# Patient Record
Sex: MALE | Race: WHITE | NOT HISPANIC OR LATINO | Employment: FULL TIME | URBAN - METROPOLITAN AREA
[De-identification: names, ages, dates, MRNs, and addresses within clinical notes are randomized per-mention and may not be internally consistent; named-entity substitution may affect disease eponyms.]

---

## 2017-08-18 ENCOUNTER — HOSPITAL ENCOUNTER (EMERGENCY)
Facility: HOSPITAL | Age: 62
Discharge: HOME | End: 2017-08-18
Attending: EMERGENCY MEDICINE

## 2017-08-18 VITALS
HEART RATE: 76 BPM | RESPIRATION RATE: 18 BRPM | WEIGHT: 180 LBS | BODY MASS INDEX: 33.13 KG/M2 | SYSTOLIC BLOOD PRESSURE: 151 MMHG | TEMPERATURE: 98 F | DIASTOLIC BLOOD PRESSURE: 89 MMHG | OXYGEN SATURATION: 98 % | HEIGHT: 62 IN

## 2017-08-18 DIAGNOSIS — R73.9 HYPERGLYCEMIA: ICD-10-CM

## 2017-08-18 DIAGNOSIS — R51.9 HEADACHE, UNSPECIFIED HEADACHE TYPE: Primary | ICD-10-CM

## 2017-08-18 LAB
ALBUMIN SERPL BCP-MCNC: 3.8 G/DL
ALP SERPL-CCNC: 137 U/L
ALT SERPL W/O P-5'-P-CCNC: 24 U/L
ANION GAP SERPL CALC-SCNC: 10 MMOL/L
AST SERPL-CCNC: 15 U/L
BASOPHILS # BLD AUTO: 0.02 K/UL
BASOPHILS NFR BLD: 0.3 %
BILIRUB SERPL-MCNC: 0.4 MG/DL
BUN SERPL-MCNC: 18 MG/DL
CALCIUM SERPL-MCNC: 9.2 MG/DL
CHLORIDE SERPL-SCNC: 99 MMOL/L
CO2 SERPL-SCNC: 24 MMOL/L
CREAT SERPL-MCNC: 1.4 MG/DL
DIFFERENTIAL METHOD: ABNORMAL
EOSINOPHIL # BLD AUTO: 0.2 K/UL
EOSINOPHIL NFR BLD: 2 %
ERYTHROCYTE [DISTWIDTH] IN BLOOD BY AUTOMATED COUNT: 13.3 %
EST. GFR  (AFRICAN AMERICAN): >60 ML/MIN/1.73 M^2
EST. GFR  (NON AFRICAN AMERICAN): 54 ML/MIN/1.73 M^2
GLUCOSE SERPL-MCNC: 252 MG/DL
GLUCOSE SERPL-MCNC: 342 MG/DL (ref 70–110)
HCT VFR BLD AUTO: 39.7 %
HGB BLD-MCNC: 13.4 G/DL
LYMPHOCYTES # BLD AUTO: 2.4 K/UL
LYMPHOCYTES NFR BLD: 31.9 %
MCH RBC QN AUTO: 26.1 PG
MCHC RBC AUTO-ENTMCNC: 33.8 G/DL
MCV RBC AUTO: 77 FL
MONOCYTES # BLD AUTO: 0.8 K/UL
MONOCYTES NFR BLD: 9.9 %
NEUTROPHILS # BLD AUTO: 4.2 K/UL
NEUTROPHILS NFR BLD: 55.6 %
PLATELET # BLD AUTO: 293 K/UL
PMV BLD AUTO: 8.7 FL
POTASSIUM SERPL-SCNC: 3.8 MMOL/L
PROT SERPL-MCNC: 7.5 G/DL
RBC # BLD AUTO: 5.13 M/UL
SODIUM SERPL-SCNC: 133 MMOL/L
WBC # BLD AUTO: 7.59 K/UL

## 2017-08-18 PROCEDURE — 25000003 PHARM REV CODE 250: Performed by: EMERGENCY MEDICINE

## 2017-08-18 PROCEDURE — 96360 HYDRATION IV INFUSION INIT: CPT

## 2017-08-18 PROCEDURE — 25000003 PHARM REV CODE 250: Performed by: PHYSICIAN ASSISTANT

## 2017-08-18 PROCEDURE — 82962 GLUCOSE BLOOD TEST: CPT

## 2017-08-18 PROCEDURE — 85025 COMPLETE CBC W/AUTO DIFF WBC: CPT

## 2017-08-18 PROCEDURE — 80053 COMPREHEN METABOLIC PANEL: CPT

## 2017-08-18 PROCEDURE — 99283 EMERGENCY DEPT VISIT LOW MDM: CPT | Mod: 25

## 2017-08-18 RX ORDER — LISINOPRIL 5 MG/1
5 TABLET ORAL DAILY
COMMUNITY
End: 2019-05-11

## 2017-08-18 RX ORDER — IBUPROFEN 400 MG/1
800 TABLET ORAL
Status: COMPLETED | OUTPATIENT
Start: 2017-08-18 | End: 2017-08-18

## 2017-08-18 RX ADMIN — SODIUM CHLORIDE 1000 ML: 0.9 INJECTION, SOLUTION INTRAVENOUS at 08:08

## 2017-08-18 RX ADMIN — IBUPROFEN 800 MG: 400 TABLET, FILM COATED ORAL at 06:08

## 2017-08-18 NOTE — ED PROVIDER NOTES
Encounter Date: 8/18/2017       History     Chief Complaint   Patient presents with    Headache     headache to back of head and pain to back of neck, blurry vision     The history is provided by the patient.   Headache    This is a new problem. The current episode started yesterday. The problem occurs intermittently. The pain is located in the occipital region. The pain does not radiate. The quality of the pain is described as shooting and throbbing. The pain is at a severity of 8/10. Associated symptoms include muscle aches (to neck ) and a visual change. Pertinent negatives include no abdominal pain, blurred vision, fever, nausea, numbness, phonophobia, photophobia, scalp tenderness, vomiting or weakness. Exacerbated by: turning head to the right  He has tried NSAIDs (took one pill of ibuprofen today with no improvement of symptoms ) for the symptoms. The treatment provided no relief. His past medical history is significant for hypertension. There is no history of migraine headaches, migraines in the family or recent head traumas.     Review of patient's allergies indicates:  No Known Allergies  Past Medical History:   Diagnosis Date    Diabetes mellitus     Hypertension      Past Surgical History:   Procedure Laterality Date    FINGER SURGERY      right hand middle finger     History reviewed. No pertinent family history.  Social History   Substance Use Topics    Smoking status: Former Smoker    Smokeless tobacco: Never Used    Alcohol use No     Review of Systems   Constitutional: Negative for fever.   Eyes: Positive for visual disturbance. Negative for blurred vision and photophobia.   Respiratory: Negative for shortness of breath.    Cardiovascular: Negative for chest pain.   Gastrointestinal: Negative for abdominal pain, nausea and vomiting.   Musculoskeletal: Negative for neck stiffness.   Skin: Negative for color change and rash.   Neurological: Positive for headaches. Negative for weakness and  numbness.   Hematological: Negative for adenopathy.   Psychiatric/Behavioral: Negative for agitation.   All other systems reviewed and are negative.      Physical Exam     Initial Vitals [08/18/17 1706]   BP Pulse Resp Temp SpO2   (!) 182/93 90 18 97.9 °F (36.6 °C) --      MAP       122.67         Physical Exam    Nursing note and vitals reviewed.  Constitutional: He appears well-developed and well-nourished.   HENT:   Head: Normocephalic and atraumatic.   Right Ear: External ear normal.   Left Ear: External ear normal.   Nose: Nose normal.   Mouth/Throat: Oropharynx is clear and moist.   Eyes: EOM are normal.   Neck: Normal range of motion. Neck supple.   Cardiovascular: Normal rate and regular rhythm.   Pulmonary/Chest: Breath sounds normal.   Abdominal: Soft. Bowel sounds are normal. He exhibits no distension. There is no tenderness. There is no rebound and no guarding.   Musculoskeletal: He exhibits no edema.   Increased warmth to Lateral malleolus with pain upon palpation.  No ecchymosis noted.     Neurological: He is alert and oriented to person, place, and time. No cranial nerve deficit. Coordination and gait normal. GCS eye subscore is 4. GCS verbal subscore is 5. GCS motor subscore is 6.   Skin: Skin is warm and dry. Capillary refill takes less than 2 seconds. No rash and no abscess noted. No erythema.   Psychiatric: He has a normal mood and affect. Thought content normal.         ED Course   Procedures  Labs Reviewed   CBC W/ AUTO DIFFERENTIAL - Abnormal; Notable for the following:        Result Value    Hemoglobin 13.4 (*)     Hematocrit 39.7 (*)     MCV 77 (*)     MCH 26.1 (*)     MPV 8.7 (*)     All other components within normal limits   COMPREHENSIVE METABOLIC PANEL - Abnormal; Notable for the following:     Sodium 133 (*)     Glucose 252 (*)     Alkaline Phosphatase 137 (*)     eGFR if non  54 (*)     All other components within normal limits   POCT GLUCOSE - Abnormal; Notable for the  "following:     POCT Glucose 342 (*)     All other components within normal limits             Medical Decision Making:   Initial Assessment:   Headache  Differential Diagnosis:   Tension headache,migraine, cluster headache, sinus headache    Clinical Tests:   Lab Tests: Ordered and Reviewed  The following lab test(s) were unremarkable: CBC and CMP  ED Management:  Feel the patient's headache is benign.  He looks well - no fever or meningeal signs.  The headache completely resolved with Motrin.  No neck stiffness, vision changes, fever, rash, meningismus/neck stiffness to suggest pseudotumor cerebri or meningitis.  No pain over temporal arteries or vision changes/loss to suggest temporal arteritis.  No "thunderclap onset" or neck stiffness to suggest spontaneous SAH/ICH.  No lancinated pain to eyes with tearing to suggest cluster headache.  No sinus pressure or nasal congestion to suggest sinus headache.  Patient's headache is not in a "band like" distrubution to suggest tension headache.  Pt was hyperglycemic on accu check so labs done to [facility] for DKA/dehydration.  No acute lab abnormalities.  HA may be related to mild dehydration from DKA.  He was given IVFs in ER and motrin with relief of his HA.    RX: ibuprofen   Other:   I discussed test(s) with the performing physician.              Attending Attestation:     Physician Attestation Statement for NP/PA:   I have conducted a face to face encounter with this patient in addition to the NP/PA, due to NP/PA Request    Other NP/PA Attestation Additions:    History of Present Illness: 61M with HA that started gradually on yesterday    Medical Decision Making: Looks well, no fever, non-toxic.  No meningeal signs.  Hyperglycemic on accucheck so labs done to rule out DKA.  He was given IVFs and motrin.  HA resolved.  I feel HA is likely from mild dehydration.  Increase fluids, take motrin as needed.                 ED Course     Clinical Impression:   The primary encounter " diagnosis was Headache, unspecified headache type. A diagnosis of Hyperglycemia was also pertinent to this visit.                           Lilliana Wallace PA-C  08/19/17 9827       Marina Orellana MD  08/19/17 9722

## 2017-08-18 NOTE — ED NOTES
Pt c/o posterior HA with visual disturbance, described as seeing gray,  since yesterday.  Pt denies taking any medication for pain.  Pt states he took his prescribed medication for HTN and DM this am.  Pt denies CP.

## 2017-08-19 LAB — POCT GLUCOSE: 342 MG/DL (ref 70–110)

## 2017-08-19 NOTE — DISCHARGE INSTRUCTIONS
Drink plenty of fluids - your sugar is high.  High sugar can cause dehydration, which can cause a headache.  Continue your diabetes medicine.  Take ibuprofen 600mg every 6 hours for headache.  See your doctor for a recheck next week.

## 2019-05-11 ENCOUNTER — HOSPITAL ENCOUNTER (EMERGENCY)
Facility: HOSPITAL | Age: 64
Discharge: HOME | End: 2019-05-11
Attending: EMERGENCY MEDICINE

## 2019-05-11 VITALS
RESPIRATION RATE: 18 BRPM | SYSTOLIC BLOOD PRESSURE: 169 MMHG | BODY MASS INDEX: 28.12 KG/M2 | TEMPERATURE: 98 F | HEART RATE: 84 BPM | WEIGHT: 175 LBS | HEIGHT: 66 IN | DIASTOLIC BLOOD PRESSURE: 96 MMHG | OXYGEN SATURATION: 100 %

## 2019-05-11 DIAGNOSIS — M54.32 SCIATICA OF LEFT SIDE: Primary | ICD-10-CM

## 2019-05-11 DIAGNOSIS — I10 ELEVATED BLOOD PRESSURE READING WITH DIAGNOSIS OF HYPERTENSION: ICD-10-CM

## 2019-05-11 DIAGNOSIS — R73.9 ELEVATED BLOOD SUGAR: ICD-10-CM

## 2019-05-11 LAB
ALBUMIN SERPL BCP-MCNC: 3.5 G/DL (ref 3.5–5.2)
ALP SERPL-CCNC: 118 U/L (ref 55–135)
ALT SERPL W/O P-5'-P-CCNC: 16 U/L (ref 10–44)
ANION GAP SERPL CALC-SCNC: 9 MMOL/L (ref 8–16)
AST SERPL-CCNC: 15 U/L (ref 10–40)
B-OH-BUTYR BLD STRIP-SCNC: 0.8 MMOL/L (ref 0–0.5)
BACTERIA #/AREA URNS HPF: NORMAL /HPF
BASOPHILS # BLD AUTO: 0.03 K/UL (ref 0–0.2)
BASOPHILS NFR BLD: 0.4 % (ref 0–1.9)
BILIRUB SERPL-MCNC: 0.4 MG/DL (ref 0.1–1)
BILIRUB UR QL STRIP: NEGATIVE
BUN SERPL-MCNC: 22 MG/DL (ref 8–23)
CALCIUM SERPL-MCNC: 9.5 MG/DL (ref 8.7–10.5)
CHLORIDE SERPL-SCNC: 97 MMOL/L (ref 95–110)
CLARITY UR: CLEAR
CO2 SERPL-SCNC: 26 MMOL/L (ref 23–29)
COLOR UR: YELLOW
CREAT SERPL-MCNC: 1.4 MG/DL (ref 0.5–1.4)
DIFFERENTIAL METHOD: ABNORMAL
EOSINOPHIL # BLD AUTO: 0.1 K/UL (ref 0–0.5)
EOSINOPHIL NFR BLD: 1.5 % (ref 0–8)
ERYTHROCYTE [DISTWIDTH] IN BLOOD BY AUTOMATED COUNT: 12.6 % (ref 11.5–14.5)
EST. GFR  (AFRICAN AMERICAN): >60 ML/MIN/1.73 M^2
EST. GFR  (NON AFRICAN AMERICAN): 53 ML/MIN/1.73 M^2
GLUCOSE SERPL-MCNC: 431 MG/DL (ref 70–110)
GLUCOSE UR QL STRIP: ABNORMAL
HCT VFR BLD AUTO: 42.7 % (ref 40–54)
HGB BLD-MCNC: 14.4 G/DL (ref 14–18)
HGB UR QL STRIP: ABNORMAL
HYALINE CASTS #/AREA URNS LPF: 0 /LPF
KETONES UR QL STRIP: ABNORMAL
LEUKOCYTE ESTERASE UR QL STRIP: NEGATIVE
LYMPHOCYTES # BLD AUTO: 1.9 K/UL (ref 1–4.8)
LYMPHOCYTES NFR BLD: 27 % (ref 18–48)
MCH RBC QN AUTO: 26.6 PG (ref 27–31)
MCHC RBC AUTO-ENTMCNC: 33.7 G/DL (ref 32–36)
MCV RBC AUTO: 79 FL (ref 82–98)
MICROSCOPIC COMMENT: NORMAL
MONOCYTES # BLD AUTO: 0.7 K/UL (ref 0.3–1)
MONOCYTES NFR BLD: 9.4 % (ref 4–15)
NEUTROPHILS # BLD AUTO: 4.4 K/UL (ref 1.8–7.7)
NEUTROPHILS NFR BLD: 61.4 % (ref 38–73)
NITRITE UR QL STRIP: NEGATIVE
PH UR STRIP: 6 [PH] (ref 5–8)
PLATELET # BLD AUTO: 326 K/UL (ref 150–350)
PMV BLD AUTO: 8.7 FL (ref 9.2–12.9)
POCT GLUCOSE: 272 MG/DL (ref 70–110)
POCT GLUCOSE: 330 MG/DL (ref 70–110)
POCT GLUCOSE: 362 MG/DL (ref 70–110)
POTASSIUM SERPL-SCNC: 4.3 MMOL/L (ref 3.5–5.1)
PROT SERPL-MCNC: 7.3 G/DL (ref 6–8.4)
PROT UR QL STRIP: ABNORMAL
RBC # BLD AUTO: 5.42 M/UL (ref 4.6–6.2)
RBC #/AREA URNS HPF: 2 /HPF (ref 0–4)
SODIUM SERPL-SCNC: 132 MMOL/L (ref 136–145)
SP GR UR STRIP: 1.01 (ref 1–1.03)
URN SPEC COLLECT METH UR: ABNORMAL
UROBILINOGEN UR STRIP-ACNC: NEGATIVE EU/DL
WBC # BLD AUTO: 7.11 K/UL (ref 3.9–12.7)
WBC #/AREA URNS HPF: 0 /HPF (ref 0–5)
YEAST URNS QL MICRO: NORMAL

## 2019-05-11 PROCEDURE — 25000003 PHARM REV CODE 250: Performed by: NURSE PRACTITIONER

## 2019-05-11 PROCEDURE — 96375 TX/PRO/DX INJ NEW DRUG ADDON: CPT

## 2019-05-11 PROCEDURE — 99284 EMERGENCY DEPT VISIT MOD MDM: CPT | Mod: 25

## 2019-05-11 PROCEDURE — 96374 THER/PROPH/DIAG INJ IV PUSH: CPT

## 2019-05-11 PROCEDURE — 63600175 PHARM REV CODE 636 W HCPCS: Performed by: NURSE PRACTITIONER

## 2019-05-11 PROCEDURE — 81000 URINALYSIS NONAUTO W/SCOPE: CPT

## 2019-05-11 PROCEDURE — 82010 KETONE BODYS QUAN: CPT

## 2019-05-11 PROCEDURE — 80053 COMPREHEN METABOLIC PANEL: CPT

## 2019-05-11 PROCEDURE — 82962 GLUCOSE BLOOD TEST: CPT

## 2019-05-11 PROCEDURE — 96372 THER/PROPH/DIAG INJ SC/IM: CPT

## 2019-05-11 PROCEDURE — 96361 HYDRATE IV INFUSION ADD-ON: CPT

## 2019-05-11 PROCEDURE — 85025 COMPLETE CBC W/AUTO DIFF WBC: CPT

## 2019-05-11 RX ORDER — KETOROLAC TROMETHAMINE 30 MG/ML
15 INJECTION, SOLUTION INTRAMUSCULAR; INTRAVENOUS
Status: COMPLETED | OUTPATIENT
Start: 2019-05-11 | End: 2019-05-11

## 2019-05-11 RX ORDER — METHOCARBAMOL 500 MG/1
1000 TABLET, FILM COATED ORAL 3 TIMES DAILY
Qty: 30 TABLET | Refills: 0 | Status: SHIPPED | OUTPATIENT
Start: 2019-05-11 | End: 2019-05-16

## 2019-05-11 RX ORDER — ORPHENADRINE CITRATE 30 MG/ML
30 INJECTION INTRAMUSCULAR; INTRAVENOUS
Status: COMPLETED | OUTPATIENT
Start: 2019-05-11 | End: 2019-05-11

## 2019-05-11 RX ORDER — KETOROLAC TROMETHAMINE 10 MG/1
10 TABLET, FILM COATED ORAL
Qty: 14 TABLET | Refills: 0 | Status: SHIPPED | OUTPATIENT
Start: 2019-05-11

## 2019-05-11 RX ADMIN — KETOROLAC TROMETHAMINE 15 MG: 30 INJECTION, SOLUTION INTRAMUSCULAR at 04:05

## 2019-05-11 RX ADMIN — SODIUM CHLORIDE 1000 ML: 0.9 INJECTION, SOLUTION INTRAVENOUS at 03:05

## 2019-05-11 RX ADMIN — INSULIN HUMAN 3 UNITS: 100 INJECTION, SOLUTION PARENTERAL at 05:05

## 2019-05-11 RX ADMIN — ORPHENADRINE CITRATE 30 MG: 30 INJECTION INTRAMUSCULAR; INTRAVENOUS at 04:05

## 2019-05-11 NOTE — DISCHARGE INSTRUCTIONS
Take your prescribed metformin tonight as labeled.  Take prescribed medications as labeled as needed for pain.  Do not drive, drink alcohol, operate machinery while taking Robaxin.  Follow up with PCP in 2-3 days and return to ED for any concerns or worsening symptoms.

## 2019-05-11 NOTE — ED PROVIDER NOTES
"Encounter Date: 5/11/2019       History     Chief Complaint   Patient presents with    Back Pain     c/o left lower back pain that radiates down his left leg x3 weeks     Huvert Adams 63 y.o.  with Past Medical History:  No date: Diabetes mellitus  No date: Hypertension  who presents to ED with chronic low back pain due to sciatica with back pain for approximately 3 weeks.  Patient states that the pain radiates down posterior aspect of left leg. Denies any trauma or injury. Denies any loss of bowel or bladder control.  Associates tingling.  Denies numbness.  Pt denies taking any pain medications PTA. Denies any alleviating factors.  Patient states that he was on blood pressure medication but has not been taking it for "awhile."  Denies fever, chills, neck pain/stiffness, chest pain, shortness of breath, weakness, dysuria, hematuria, or other concerns.    The history is provided by the patient and the spouse.     Review of patient's allergies indicates:  No Known Allergies  Past Medical History:   Diagnosis Date    Diabetes mellitus     Hypertension      Past Surgical History:   Procedure Laterality Date    FINGER SURGERY      right hand middle finger     History reviewed. No pertinent family history.  Social History     Tobacco Use    Smoking status: Former Smoker    Smokeless tobacco: Never Used   Substance Use Topics    Alcohol use: No    Drug use: No     Review of Systems   Constitutional: Negative for chills and fever.   Respiratory: Negative for shortness of breath.    Cardiovascular: Negative for chest pain.   Gastrointestinal: Negative for nausea and vomiting.   Genitourinary: Negative for dysuria and hematuria.        Denies bowel/bladder incontinence.   Musculoskeletal: Positive for back pain. Negative for gait problem, neck pain and neck stiffness.   Hematological: Does not bruise/bleed easily.   All other systems reviewed and are negative.      Physical Exam     Initial Vitals [05/11/19 1357] "   BP Pulse Resp Temp SpO2   (!) 178/97 (!) 122 18 98.7 °F (37.1 °C) 96 %      MAP       --         Physical Exam    Vitals reviewed.  Constitutional: He appears well-developed and well-nourished.  Non-toxic appearance. He does not have a sickly appearance.   HENT:   Head: Atraumatic.   Dry MM.   Eyes: EOM are normal.   Neck: Normal range of motion, full passive range of motion without pain and phonation normal. Neck supple.   Cardiovascular: Regular rhythm. Tachycardia present.    Pulses:       Dorsalis pedis pulses are 2+ on the right side, and 2+ on the left side.   Asymptomatic hypertension.   Pulmonary/Chest: No respiratory distress.   Abdominal: Soft.   Musculoskeletal:        Lumbar back: He exhibits tenderness and pain. He exhibits normal range of motion, no bony tenderness, no swelling, no edema, no deformity, no laceration, no spasm and normal pulse.        Back:    TTP to left SI joint, reproducible that radiates down posterior aspect of left leg.  Worse with forward flexion and extension.  Sensation and strength intact in BLE.  Dorsalis pedis equal bilaterally.  Non-tender midline CTL.  No warmth or surrounding erythema.   Neurological: He is alert and oriented to person, place, and time. He has normal strength. No sensory deficit. Gait normal.   Steady gait.   Skin: Skin is warm. No rash noted.   Psychiatric: He has a normal mood and affect.         ED Course   Procedures  Labs Reviewed   CBC W/ AUTO DIFFERENTIAL - Abnormal; Notable for the following components:       Result Value    Mean Corpuscular Volume 79 (*)     Mean Corpuscular Hemoglobin 26.6 (*)     MPV 8.7 (*)     All other components within normal limits   COMPREHENSIVE METABOLIC PANEL - Abnormal; Notable for the following components:    Sodium 132 (*)     Glucose 431 (*)     eGFR if non  53 (*)     All other components within normal limits   URINALYSIS - Abnormal; Notable for the following components:    Protein, UA 1+ (*)      Glucose, UA 3+ (*)     Ketones, UA 1+ (*)     Occult Blood UA Trace (*)     All other components within normal limits   BETA - HYDROXYBUTYRATE, SERUM - Abnormal; Notable for the following components:    Beta-Hydroxybutyrate 0.8 (*)     All other components within normal limits   POCT GLUCOSE - Abnormal; Notable for the following components:    POCT Glucose 362 (*)     All other components within normal limits   POCT GLUCOSE - Abnormal; Notable for the following components:    POCT Glucose 330 (*)     All other components within normal limits   URINALYSIS MICROSCOPIC   POCT GLUCOSE MONITORING CONTINUOUS          Imaging Results    None          Medical Decision Making:   History:   I obtained history from: someone other than patient.       <> Summary of History: Spouse  Old Medical Records: I decided to obtain old medical records.  Initial Assessment:   Pt presents to ED with left lower back pain from sciatica. Appears well, non-toxic. Afebrile. Sensation and strength intact bilaterally in lower extremities. Ambulatory. No s/s of cauda equina. No mid-line tenderness upon exam, no need for imaging today. Will treat pain and reassess.  labs drawn and fluids given.  Hypertensive in ED.  Patient denies symptoms of hypertension including:  any visual changes, dizziness, headache, shortness of breath, or chest pain.  Clinical Tests:   Lab Tests: Reviewed and Ordered  ED Management:  POCT glucose, IM norflex, IV fluids, insulin, labs, UA        5:56 PM- Patient reports complete resolution of back pain after medications given in ED.    Other:   I discussed test(s) with the performing physician.  WBC within normal limits. H&H within normal limits.  UA shows no signs of infection.  BUN and creatinine within normal limits.  Sodium 132, patient received 1 L IV fluids in ED.  POCT glucose after 3 units regular insulin IV and 1 liter NS was 267. Beta-hydroxy slightly elevated at 0.8.  Low suspicion for DKA.  No signs or  symptoms of cauda equina.  Patient's signs and symptoms most likely due to sciatica and elevated blood sugar.  Patient is hemodynamically stable will be DC home with prescription for Robaxin and naproxen.  Patient instructed to your prescribed metformin tonight as labeled and to take prescribed medications as labeled as needed for pain.  Instructed not drive, drink alcohol, operate machinery while taking Robaxin and to follow up with PCP in 2-3 days and return to ED for any concerns or worsening symptoms.  Patient also counseled on doing a better job at monitoring his blood glucose levels and also instructed to follow up with PCP about elevated blood pressure in ED.  Patient verbalized understanding, compliance, and agreement treatment plan.              Attending Attestation:             Attending ED Notes:   This patient was evaluated independently by the midlevel provider. I did not perform a face-to-face evaluation of this patient. I did, however, discuss the patient's presentation, exam, workup, and management with the midlevel provider and agree with the care rendered. Furthermore, I agree with the history, examination, assessment, and plan as documented.  Nikhil Gibbs III, M.D. - Attending             Clinical Impression:       ICD-10-CM ICD-9-CM   1. Sciatica of left side M54.32 724.3   2. Elevated blood pressure reading with diagnosis of hypertension I10 401.9   3. Elevated blood sugar R73.9 790.29         Disposition:   Disposition: Discharged  Condition: Stable                        Lenin Bella NP  05/11/19 1923       Lenin Bella NP  05/11/19 1923       Nikhil Gibbs III, MD  05/14/19 3513

## 2019-05-11 NOTE — ED TRIAGE NOTES
Pt presents to ED with C\O L back pain with onset x3 weeks ago, pt denies any falls or trauma. Pt denies taking any medication. For the pain. Denies any numbness, loss of bowel of bladder. Comfort and safety measures provided. Will continue to montior.